# Patient Record
Sex: MALE | Race: BLACK OR AFRICAN AMERICAN
[De-identification: names, ages, dates, MRNs, and addresses within clinical notes are randomized per-mention and may not be internally consistent; named-entity substitution may affect disease eponyms.]

---

## 2017-01-19 ENCOUNTER — HOSPITAL ENCOUNTER (EMERGENCY)
Dept: HOSPITAL 62 - ER | Age: 37
Discharge: HOME | End: 2017-01-19
Payer: SELF-PAY

## 2017-01-19 VITALS — SYSTOLIC BLOOD PRESSURE: 129 MMHG | DIASTOLIC BLOOD PRESSURE: 78 MMHG
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
## 2017-01-19 DIAGNOSIS — S80.211A: ICD-10-CM

## 2017-01-19 DIAGNOSIS — F17.210: ICD-10-CM

## 2017-01-19 DIAGNOSIS — S82.831A: Primary | ICD-10-CM

## 2017-01-19 PROCEDURE — 99283 EMERGENCY DEPT VISIT LOW MDM: CPT

## 2017-01-19 PROCEDURE — 2W3SX1Z IMMOBILIZATION OF RIGHT FOOT USING SPLINT: ICD-10-PCS | Performed by: EMERGENCY MEDICINE
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
## 2017-02-08 ENCOUNTER — HOSPITAL ENCOUNTER (EMERGENCY)
Dept: HOSPITAL 62 - ER | Age: 37
Discharge: HOME | End: 2017-02-08
Payer: COMMERCIAL

## 2017-02-08 VITALS — DIASTOLIC BLOOD PRESSURE: 68 MMHG | SYSTOLIC BLOOD PRESSURE: 126 MMHG
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
## 2017-02-08 DIAGNOSIS — Z59.9: ICD-10-CM

## 2017-02-08 DIAGNOSIS — S82.831D: Primary | ICD-10-CM

## 2017-02-08 PROCEDURE — 99283 EMERGENCY DEPT VISIT LOW MDM: CPT

## 2017-02-08 SDOH — ECONOMIC STABILITY - INCOME SECURITY: PROBLEM RELATED TO HOUSING AND ECONOMIC CIRCUMSTANCES, UNSPECIFIED: Z59.9
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
## 2019-07-05 ENCOUNTER — HOSPITAL ENCOUNTER (EMERGENCY)
Dept: HOSPITAL 62 - ER | Age: 39
Discharge: HOME | End: 2019-07-05
Payer: SELF-PAY

## 2019-07-05 VITALS — SYSTOLIC BLOOD PRESSURE: 138 MMHG | DIASTOLIC BLOOD PRESSURE: 80 MMHG

## 2019-07-05 DIAGNOSIS — L50.9: ICD-10-CM

## 2019-07-05 DIAGNOSIS — T63.441A: Primary | ICD-10-CM

## 2019-07-05 PROCEDURE — 96372 THER/PROPH/DIAG INJ SC/IM: CPT

## 2019-07-05 PROCEDURE — 96374 THER/PROPH/DIAG INJ IV PUSH: CPT

## 2019-07-05 PROCEDURE — S0028 INJECTION, FAMOTIDINE, 20 MG: HCPCS

## 2019-07-05 PROCEDURE — 96375 TX/PRO/DX INJ NEW DRUG ADDON: CPT

## 2019-07-05 PROCEDURE — 96361 HYDRATE IV INFUSION ADD-ON: CPT

## 2019-07-05 PROCEDURE — 99282 EMERGENCY DEPT VISIT SF MDM: CPT

## 2019-07-05 NOTE — ER DOCUMENT REPORT
ED Allergic Reaction





- General


Chief Complaint: Allergic Reaction


Stated Complaint: POSSIBLE BEE STING


Time Seen by Provider: 07/05/19 19:55


Primary Care Provider: 


VICK SPRINGER FNP [NO LOCAL MD] - Follow up as needed


Mode of Arrival: Ambulatory


Information source: Patient


TRAVEL OUTSIDE OF THE [place] IN LAST 30 DAYS: No





- HPI


Patient complains to provider of: Patient was stung by a bee prior to arrival in

the ED.


Onset: Just prior to arrival


Onset/Duration: Sudden


Quality of pain: Sharp


Severity: Mild


Pain Level: 1


Identified cause: Yes


Medication Exposure: Bee sting


Other exposure: Other - Bee sting


Skin rash / itching: Facial, "Redness", "Hives"


Swelling: Face


Associated symptoms: Other - Itching


Similar symptoms previously: No


Recently seen / treated by doctor: No





- Related Data


Allergies/Adverse Reactions: 


                                        





No Known Allergies Allergy (Verified 12/19/12 20:10)


   











Past Medical History





- General


Information source: Patient





- Social History


Smoking Status: Unknown if Ever Smoked


Chew tobacco use (# tins/day): No


Frequency of alcohol use: None


Drug Abuse: None


Family History: Reviewed & Not Pertinent


Patient has suicidal ideation: No


Patient has homicidal ideation: No


Renal/ Medical History: Denies: Hx Peritoneal Dialysis


Psychiatric Medical History: Reports: Hx Depression


Past Surgical History: Reports: Hx Urinary Tract Surgery - urethra stretching 

when he was 7 years old





- Immunizations


Hx Diphtheria, Pertussis, Tetanus Vaccination: Yes





Review of Systems





- Review of Systems


Constitutional: No symptoms reported


EENT: No symptoms reported


Cardiovascular: No symptoms reported


Respiratory: No symptoms reported


Gastrointestinal: No symptoms reported


Genitourinary: No symptoms reported


Male Genitourinary: No symptoms reported


Musculoskeletal: No symptoms reported


Skin: Rash, Other - Hives on the right forearm.  Itching.


Hematologic/Lymphatic: No symptoms reported


Neurological/Psychological: No symptoms reported


-: Yes All other systems reviewed and negative





Physical Exam





- Vital signs


Vitals: 


                                        











Temp Pulse Resp BP Pulse Ox


 


 98.3 F   100   20   144/78 H  96 


 


 07/05/19 19:42  07/05/19 19:42  07/05/19 19:42  07/05/19 19:42  07/05/19 19:42











Interpretation: Normal





- General


General appearance: Appears well, Alert





- HEENT


Head: Normocephalic, Atraumatic


Eyes: Normal


Pupils: PERRL





- Respiratory


Respiratory status: No respiratory distress


Chest status: Nontender


Breath sounds: Normal


Chest palpation: Normal





- Cardiovascular


Rhythm: Regular


Heart sounds: Normal auscultation


Murmur: No





- Abdominal


Inspection: Normal


Distension: No distension


Bowel sounds: Normal


Tenderness: Nontender


Organomegaly: No organomegaly





- Back


Back: Normal, Nontender





- Extremities


General upper extremity: Normal inspection, Nontender, Normal color, Normal ROM,

Normal temperature, Other - -Hives on the right forearm and the site of the bee 

sting.


General lower extremity: Normal inspection, Nontender, Normal color, Normal ROM,

Normal temperature, Normal weight bearing.  No: Scarlet's sign





- Neurological


Neuro grossly intact: Yes


Cognition: Normal


Orientation: AAOx4


[place] Coma Scale Eye Opening: Spontaneous


[place] Coma Scale Verbal: Oriented


[place] Coma Scale Motor: Obeys Commands


[place] Coma Scale Total: 15


Speech: Normal


Motor strength normal: LUE, RUE, LLE, RLE


Sensory: Normal





- Psychological


Associated symptoms: Normal affect, Normal mood





- Skin


Skin Temperature: Warm


Skin Moisture: Dry


Skin Color: Normal





Course





- Re-evaluation


Re-evalutation: 





07/06/19 04:56


Patient's symptoms have completely resolved after receiving treatment in the ED.

 Patient said he wants to be discharged home.





- Vital Signs


Vital signs: 


                                        











Temp Pulse Resp BP Pulse Ox


 


 97.9 F   88   20   138/80 H  99 


 


 07/05/19 23:39  07/05/19 23:39  07/05/19 23:39  07/05/19 23:39  07/05/19 23:39














Discharge





- Discharge


Clinical Impression: 


 Allergic reaction to bee sting





Condition: Stable


Disposition: HOME, SELF-CARE


Instructions:  Acute Allergic Reaction ([facility])


Additional Instructions: 


Please follow-up with your primary doctor Monday.


Return to the emergency room if your condition worsens.


Prescriptions: 


Diphenhydramine HCl [Benadryl 50 mg Capsule] 1 cap PO Q8 PRN #15 capsule


 PRN Reason: Itching


Epinephrine [Epipen Jr 0.15 mg/0.3 mL AutoInject] 1 ea IM ASDIR PRN #1 autoin

jector


 PRN Reason: 


Famotidine [Pepcid 40 mg Tablet] 40 mg PO DAILY #10 tablet


Prednisone [Deltasone 20 mg Tablet] 3 tab PO DAILY 3 Days  tablet


Referrals: 


SPRINGER,VICK C, FNP [NO LOCAL MD] - Follow up as needed

## 2019-07-05 NOTE — ER DOCUMENT REPORT
ED Medical Screen (RME)





- General


Chief Complaint: Allergic Reaction


Stated Complaint: POSSIBLE BEE STING


Time Seen by Provider: 07/05/19 19:55


Mode of Arrival: Ambulatory


Information source: Patient


Notes: 





Patient is a 39-year-old male presented emergency department after being stung 

by a bee approximately 30 minutes prior to arrival.  Patient reports no history 

of allergies to this in the past.  He reports that he feels like his throat is 

swelling.  He reports he has itching all over and has now developed a rash.





Exam:


Scattered hives on patient's upper arms, torso front and back.


Patient speaking in full complete sentences, swallowing saliva without 

difficulty.





I have greeted and performed a rapid initial assessment of this patient.  A 

comprehensive ED assessment and evaluation of the patient, analysis of test 

results and completion of the medical decision making process will be conducted 

by additional ED providers.  I have specifically instructed the patient or 

family members with the patient to immediately return to any nursing staff 

should anything change in the patient's condition or with their chief complaint.





This medical record was dictated with voice recognizing software.  There may be 

grammatical, syntax errors that are unintended.


TRAVEL OUTSIDE OF THE [place] IN LAST 30 DAYS: No





- Related Data


Allergies/Adverse Reactions: 


                                        





No Known Allergies Allergy (Verified 12/19/12 20:10)


   











Past Medical History


Renal/ Medical History: Denies: Hx Peritoneal Dialysis


Psychiatric Medical History: Reports: Hx Depression


Past Surgical History: Reports: Hx Urinary Tract Surgery - urethra stretching 

when he was 7 years old





- Immunizations


Hx Diphtheria, Pertussis, Tetanus Vaccination: Yes





Physical Exam





- Vital signs


Vitals: 





                                        











Temp Pulse Resp BP Pulse Ox


 


 98.3 F   100   20   144/78 H  96 


 


 07/05/19 19:42  07/05/19 19:42  07/05/19 19:42  07/05/19 19:42  07/05/19 19:42














Course





- Vital Signs


Vital signs: 





                                        











Temp Pulse Resp BP Pulse Ox


 


 98.3 F   100   20   144/78 H  96 


 


 07/05/19 19:42  07/05/19 19:42  07/05/19 19:42  07/05/19 19:42  07/05/19 19:42